# Patient Record
Sex: MALE | Race: WHITE | NOT HISPANIC OR LATINO | Employment: FULL TIME | URBAN - METROPOLITAN AREA
[De-identification: names, ages, dates, MRNs, and addresses within clinical notes are randomized per-mention and may not be internally consistent; named-entity substitution may affect disease eponyms.]

---

## 2022-05-04 ENCOUNTER — OFFICE VISIT (OUTPATIENT)
Dept: FAMILY MEDICINE CLINIC | Facility: CLINIC | Age: 42
End: 2022-05-04

## 2022-05-04 VITALS
OXYGEN SATURATION: 98 % | SYSTOLIC BLOOD PRESSURE: 122 MMHG | HEART RATE: 67 BPM | BODY MASS INDEX: 27.17 KG/M2 | HEIGHT: 73 IN | DIASTOLIC BLOOD PRESSURE: 85 MMHG | WEIGHT: 205 LBS | RESPIRATION RATE: 16 BRPM | TEMPERATURE: 97.5 F

## 2022-05-04 DIAGNOSIS — M54.16 LUMBAR RADICULOPATHY: Primary | ICD-10-CM

## 2022-05-04 PROCEDURE — 99203 OFFICE O/P NEW LOW 30 MIN: CPT | Performed by: NURSE PRACTITIONER

## 2022-05-04 RX ORDER — PREDNISONE 20 MG/1
TABLET ORAL
Qty: 20 TABLET | Refills: 0 | Status: SHIPPED | OUTPATIENT
Start: 2022-05-04

## 2022-05-04 NOTE — PROGRESS NOTES
Subjective   Shelton Downs is a 41 y.o. male.     History of Present Illness   Shelton Downs 41 y.o. male who presents today for a new patient appointment.    he has a history of There is no problem list on file for this patient.  .  he is here to establish care I reviewed the PFSH recorded today by my MA/LPN staff.   he   He has been feeling well.    Patient presents to discuss recent onset of sciatica. Symptoms started about a month ago.  He was seen for this at  a week ago and given oral steroid which mostly cleared up symptoms.  He reports since, he has started to have right calf and foot pain which he did not initially have with the original symptoms. Denies swelling or tenderness to touch. Denies back pain.   The following portions of the patient's history were reviewed and updated as appropriate: allergies, current medications, past family history, past medical history, past social history, past surgical history and problem list.    Review of Systems   Constitutional: Negative for fatigue.   Respiratory: Negative for cough and shortness of breath.    Cardiovascular: Negative for chest pain and palpitations.   Skin: Negative for rash.   Psychiatric/Behavioral: Negative for dysphoric mood and sleep disturbance. The patient is not nervous/anxious.        Objective   Physical Exam  Vitals and nursing note reviewed.   Constitutional:       Appearance: He is well-developed.   Cardiovascular:      Rate and Rhythm: Normal rate and regular rhythm.   Pulmonary:      Effort: Pulmonary effort is normal.      Breath sounds: Normal breath sounds.   Musculoskeletal:      Lumbar back: No swelling, edema, deformity, signs of trauma, lacerations, spasms, tenderness or bony tenderness. Normal range of motion. No scoliosis.   Neurological:      Mental Status: He is oriented to person, place, and time.   Psychiatric:         Mood and Affect: Mood normal.         Behavior: Behavior normal.         Thought Content: Thought  content normal.         Judgment: Judgment normal.     5 view xray of lumbar spine ordered and reviewed by me. No significant joint space narrowing. No comparison on file.     Assessment/Plan   Diagnoses and all orders for this visit:    1. Lumbar radiculopathy (Primary)  -     XR Spine Lumbar 4+ View (In Office)  -     predniSONE (DELTASONE) 20 MG tablet; Take 3 tabs QDPC x 3 days then 2 tabs QDPC x 4 days then 1 tab QDPC x 3 days  Dispense: 20 tablet; Refill: 0

## 2022-05-05 ENCOUNTER — TELEPHONE (OUTPATIENT)
Dept: FAMILY MEDICINE CLINIC | Facility: CLINIC | Age: 42
End: 2022-05-05

## 2022-05-05 NOTE — TELEPHONE ENCOUNTER
Caller: Shelton Downs    Relationship: Self    Best call back number: [phone]    What is the best time to reach you: ANYTIME  AFTER LUNCH    Who are you requesting to speak with (clinical staff, provider,  specific staff member): CLINICAL STAFF    What was the call regarding: PATIENT HAS QUESTIONS REGARDING THE DOSAGE OF HIS predniSONE (DELTASONE) 20 MG tablet.     PLEASE CALL TO ADVISE

## 2022-06-03 ENCOUNTER — TELEPHONE (OUTPATIENT)
Dept: FAMILY MEDICINE CLINIC | Facility: CLINIC | Age: 42
End: 2022-06-03

## 2022-06-03 DIAGNOSIS — M54.16 LUMBAR RADICULOPATHY: Primary | ICD-10-CM

## 2022-06-03 NOTE — TELEPHONE ENCOUNTER
Caller: Shelton Downs    Relationship: Self    Best call back number: [phone] (H)    What orders are you requesting (i.e. lab or imaging): MRI ORDERS    In what timeframe would the patient need to come in: ASAP    Where will you receive your lab/imaging services: UNKNOWN    Additional notes:     PATIENT STATES HE HAS ISSUES WITH HIS SCIATIC NERVE AND ASHLEY RECOMMENDED HIM NOTIFYING  HER IF THE ISSUES PERSISTED AND SHE WOULD GO AHEAD AND ORDER AN MRI FOR THE PATIENT.    PLEASE CALL AND ADVISE

## 2022-06-10 ENCOUNTER — TELEPHONE (OUTPATIENT)
Dept: FAMILY MEDICINE CLINIC | Facility: CLINIC | Age: 42
End: 2022-06-10

## 2022-06-10 NOTE — TELEPHONE ENCOUNTER
Caller: Shelton Downs    Relationship: Self    Best call back number:   [phone]    What specialty or service is being requested: [id number] - MRI LUMBAR SPINE WO CONTRAST    What is the provider, practice or medical service name: [facility]    What is the office location: [address] [place] [address] [address], [place] [address]    What is the office phone number: [phone]    Any additional details: PATIENT WOULD LIKE THE REFERRAL SENT TO [facility]

## 2022-06-20 ENCOUNTER — TELEPHONE (OUTPATIENT)
Dept: FAMILY MEDICINE CLINIC | Facility: CLINIC | Age: 42
End: 2022-06-20

## 2022-06-20 NOTE — TELEPHONE ENCOUNTER
Caller: Shelton Downs    Relationship: Self    Best call back number: [phone]    What is the best time to reach you: ANY    Who are you requesting to speak with (clinical staff, provider,  specific staff member): CLINICAL STAFF    Do you know the name of the person who called: PATIENT    What was the call regarding:CALLING TO SEE THE RESULTS OF HIS MRI    Do you require a callback: YES

## 2022-06-20 NOTE — TELEPHONE ENCOUNTER
Herniated disc noted L3-4 and L4-5.  Recommend referral to ortho spine or neurosurgery for further evaluation

## 2022-06-21 NOTE — TELEPHONE ENCOUNTER
Spoke with patient about results, I am e-mailing him a copy or report and he will call when he decides what type of ref he wants.